# Patient Record
Sex: FEMALE | Race: WHITE | NOT HISPANIC OR LATINO | Employment: UNEMPLOYED | URBAN - METROPOLITAN AREA
[De-identification: names, ages, dates, MRNs, and addresses within clinical notes are randomized per-mention and may not be internally consistent; named-entity substitution may affect disease eponyms.]

---

## 2019-03-11 ENCOUNTER — APPOINTMENT (OUTPATIENT)
Dept: SLEEP MEDICINE | Facility: HOSPITAL | Age: 34
End: 2019-03-11

## 2021-01-05 ENCOUNTER — TELEPHONE (OUTPATIENT)
Dept: OBSTETRICS AND GYNECOLOGY | Age: 36
End: 2021-01-05

## 2021-01-05 NOTE — TELEPHONE ENCOUNTER
Tried calling pt and pt's spouse. Neither answered, VM was full or call could not be completed.     Tried calling pt to see if she plans on keeping appt tomorrow. If she no shows, she could be dismissed from the practice due to several no-shows as a new patient

## 2021-07-29 ENCOUNTER — TELEPHONE (OUTPATIENT)
Dept: URGENT CARE | Facility: CLINIC | Age: 36
End: 2021-07-29

## 2021-07-29 NOTE — TELEPHONE ENCOUNTER
Received a note on an [facility] that said to call the patient to let her know she was prescribed some pain medication. SHe also needed to take OTC Tylenol, 2 every 6hrs and OTC Ibuprofen 4, eery 8rs for pain- per Kirsten CORBETT.     Pt aware.   SH

## 2023-05-05 ENCOUNTER — OFFICE VISIT (OUTPATIENT)
Dept: OBSTETRICS AND GYNECOLOGY | Age: 38
End: 2023-05-05
Payer: COMMERCIAL

## 2023-05-05 VITALS
BODY MASS INDEX: 27.32 KG/M2 | WEIGHT: 164 LBS | SYSTOLIC BLOOD PRESSURE: 100 MMHG | DIASTOLIC BLOOD PRESSURE: 68 MMHG | HEIGHT: 65 IN

## 2023-05-05 DIAGNOSIS — E28.39 PREMATURE OVARIAN FAILURE: ICD-10-CM

## 2023-05-05 DIAGNOSIS — Z13.71 BRCA GENE MUTATION NEGATIVE: ICD-10-CM

## 2023-05-05 DIAGNOSIS — Z12.4 SCREENING FOR CERVICAL CANCER: ICD-10-CM

## 2023-05-05 DIAGNOSIS — Z01.419 WELL WOMAN EXAM WITH ROUTINE GYNECOLOGICAL EXAM: Primary | ICD-10-CM

## 2023-05-05 DIAGNOSIS — Z11.3 SCREEN FOR STD (SEXUALLY TRANSMITTED DISEASE): ICD-10-CM

## 2023-05-05 NOTE — PROGRESS NOTES
[facility]   Obstetrics and Gynecology   Routine Annual Visit    2023    Patient: Amber Albert          MR#:[id number]    History of Present Illness    Chief Complaint   Patient presents with   • Gynecologic Exam     Cc:  new pt today for annual visit , last seen and had pap  , no periods - premature ovarian failure , h/o infertility        37 y.o. female  who presents for annual exam.  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 Rodríguez around 27yo.  She was not menstruating and was unable to get pregnant which eventually led to the diagnosis.  She was placed on HRT at that time with p.o. Estrace and Provera 10 mg 10 days/month.  She was menstruating monthly on this for a very long time but then stopped menstruating so stopped the Provera about a year ago.  She has continued p.o. estrogen however.    She reports history of left ovarian cyst.  Reports intermittent left lower quadrant pain and thinks it may be related to the cyst.    US pelvis transvaginal (2023 14:47) 4cm simple cyst in left adnexa    Last pap in  normal, denies history of abnormal Pap smear, repeat today  Never had mammogram  Family history of ovarian cancer so had testing for hereditary cancer genes and it was negative per patient report    Obstetric History:  OB History        0    Para   0    Term   0       0    AB   0    Living   0       SAB   0    IAB   0    Ectopic   0    Molar        Multiple   0    Live Births                   Menstrual History:     No LMP recorded (lmp unknown). (Menstrual status: Other).       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, two boxers and one mixed rescue    ________________________________________  Patient Active Problem List   Diagnosis   • Premature ovarian failure   • BRCA gene mutation negative     Past Medical History:   Diagnosis  Date   • Anxiety    • Migraines    • Premature ovarian failure      Past Surgical History:   Procedure Laterality Date   • NO PAST SURGERIES       Social History     Tobacco Use   Smoking Status Never   Smokeless Tobacco Never     Family History   Problem Relation Age of Onset   • Hypertension Father    • Hypertension Mother    • Anxiety disorder Mother    • Breast cancer Maternal Grandmother    • Cancer Maternal Grandmother         Unsure of type of cancer.   • Ovarian cancer Maternal Aunt 31   • Anxiety disorder Maternal Aunt    • Cancer Maternal Aunt 30        Female cancer. Unsure if ovarian.   • Heart disease Other    • Uterine cancer Neg Hx    • Colon cancer Neg Hx      Prior to Admission medications    Medication Sig Start Date End Date Taking? Authorizing Provider   eletriptan (RELPAX) 40 MG tablet Take 40 mg by mouth 1 (One) Time As Needed for Migraine. may repeat in 2 hours if necessary    Emergency, Nurse Epic, RN   estradiol (ESTRACE) 2 MG tablet Take  by mouth Daily. 3/13/19   Emergency, Nurse Epic, RN   gabapentin (NEURONTIN) 300 MG capsule Take 1 capsule by mouth 3 (Three) Times a Day As Needed (pain). 7/29/21   Kisrten Chan APRN   levothyroxine (SYNTHROID, LEVOTHROID) 75 MCG tablet Take  by mouth Daily. 2/4/19   Emergency, Nurse Bianca RN   medroxyPROGESTERone (PROVERA) 10 MG tablet TK 1 T PO QD ON DAYS 1-10 OF CYCLE 2/25/19   Emergency, Nurse Bianca RN   pantoprazole (PROTONIX) 40 MG EC tablet  7/28/21   Emergency, Nurse Bianca RN   SUMAtriptan Succinate (IMITREX) 4 MG/0.5ML injection ADM 0.5 ML SC 1 TIME. MAY REPEAT IN 1 HOUR IF PAIN RETURNS / INCREASES IN SEVERITY. MAX 2 DOSES / 24 H 3/6/19   Emergency, Nurse Bianca RN   venlafaxine XR (EFFEXOR-XR) 150 MG 24 hr capsule TAKE ONE CAPSULE BY MOUTH DAILY 2/10/16   Provider, MD Elvira     ________________________________________    Current contraception: none  History of abnormal Pap smear: no  Family history of uterine or ovarian cancer: yes -  "see above  Family History of colon cancer/colon polyps: no  History of abnormal mammogram: no    The following portions of the patient's history were reviewed and updated as appropriate: allergies, current medications, past family history, past medical history, past social history, past surgical history and problem list.    Review of Systems   All other systems reviewed and are negative.           Objective     /68   Ht 165.1 cm (65\")   Wt 74.4 kg (164 lb)   LMP  (LMP Unknown)   BMI 27.29 kg/m²    BP Readings from Last 3 Encounters:   05/05/23 100/68   07/28/21 129/82   03/26/20 137/86      Wt Readings from Last 3 Encounters:   05/05/23 74.4 kg (164 lb)   07/28/21 83 kg (183 lb)   03/26/20 79.4 kg (175 lb)        BMI: Estimated body mass index is 27.29 kg/m² as calculated from the following:    Height as of this encounter: 165.1 cm (65\").    Weight as of this encounter: 74.4 kg (164 lb).    Physical Exam  Vitals and nursing note reviewed.   Constitutional:       General: She is not in acute distress.     Appearance: Normal appearance.   HENT:      Head: Normocephalic and atraumatic.   Eyes:      Extraocular Movements: Extraocular movements intact.   Cardiovascular:      Rate and Rhythm: Normal rate and regular rhythm.      Pulses: Normal pulses.      Heart sounds: No murmur heard.  Pulmonary:      Effort: Pulmonary effort is normal. No respiratory distress.      Breath sounds: Normal breath sounds.   Chest:   Breasts:     Right: Normal. No mass, nipple discharge, skin change or tenderness.      Left: Normal. No mass, nipple discharge, skin change or tenderness.   Abdominal:      General: There is no distension.      Palpations: Abdomen is soft. There is no mass.      Tenderness: There is no abdominal tenderness.   Genitourinary:     General: Normal vulva.      Labia:         Right: No rash or lesion.         Left: No rash or lesion.       Urethra: No prolapse, urethral swelling or urethral lesion.      " Vagina: Normal.      Cervix: Normal.      Uterus: Normal.       Adnexa: Right adnexa normal and left adnexa normal.      Comments: Bladder: no masses or tenderness  Perineum/Anus: no masses, lesions, or skin changes  Musculoskeletal:         General: No swelling. Normal range of motion.      Cervical back: Normal range of motion.   Lymphadenopathy:      Upper Body:      Right upper body: No axillary adenopathy.      Left upper body: No axillary adenopathy.   Skin:     General: Skin is warm and dry.   Neurological:      General: No focal deficit present.      Mental Status: She is alert and oriented to person, place, and time.   Psychiatric:         Mood and Affect: Mood normal.         Behavior: Behavior normal.         As part of wellness and prevention, the following topics were discussed with the patient:  Encouraged self breast exam  Physical activity and regular exercised encouraged.   Injury prevention discussed.  Healthy weight discussed.  Nutrition discussed.  Substance abuse/misuse discussed.  Sexual behavior/safe practices discussed.   Sexual transmitted disease prevention   Contraception discussed.   Mental health discussed.       Assessment:  Diagnoses and all orders for this visit:    1. Well woman exam with routine gynecological exam (Primary)  -     HIV-1 / O / 2 Ag / Antibody 4th Generation  -     RPR, Rfx Qn RPR / Confirm TP  -     IGP,CtNgTv,Apt HPV,rfx 16 / 18,45    2. Premature ovarian failure    3. BRCA gene mutation negative  Overview:  Reports hereditary cancer panel neg during ADE workup      4. Screen for STD (sexually transmitted disease)  -     HIV-1 / O / 2 Ag / Antibody 4th Generation  -     RPR, Rfx Qn RPR / Confirm TP  -     IGP,CtNgTv,Apt HPV,rfx 16 / 18,45    5. Screening for cervical cancer  -     IGP,CtNgTv,Apt HPV,rfx 16 / 18,45    - Breast and pelvic exam normal  - Pap today  - STD screen today  - Discussed premature ovarian failure.  Recommendation is to continue estrogen  therapy for bone and cardiovascular health benefit until at least 50 years old.  She has a uterus in situ so needs progesterone as well.  We discussed risk of endometrial cancer with unopposed estrogen.  She would like to not take Provera anymore so we agreed on progestin IUD instead.  - Plan for GYN ultrasound at next appointment to evaluate endometrium since she has had unopposed estrogen for about a year.  We will also evaluate left ovarian cyst at that time.  If all normal, will plan for Mirena IUD placement at that visit.    Plan:  Return for Check Mirena benefits, schedule insertion with me with gyn Us before insertion for amenorrhea.      Wilma Lombardi MD  5/5/2023 15:43 EDT

## 2023-05-06 LAB
HIV 1+2 AB+HIV1 P24 AG SERPL QL IA: NON REACTIVE
RPR SER QL: NON REACTIVE

## 2023-05-12 LAB
C TRACH RRNA CVX QL NAA+PROBE: NEGATIVE
CYTOLOGIST CVX/VAG CYTO: NORMAL
CYTOLOGY CVX/VAG DOC CYTO: NORMAL
CYTOLOGY CVX/VAG DOC THIN PREP: NORMAL
DX ICD CODE: NORMAL
HIV 1 & 2 AB SER-IMP: NORMAL
HPV GENOTYPE REFLEX: NORMAL
HPV I/H RISK 4 DNA CVX QL PROBE+SIG AMP: NEGATIVE
N GONORRHOEA RRNA CVX QL NAA+PROBE: NEGATIVE
OTHER STN SPEC: NORMAL
STAT OF ADQ CVX/VAG CYTO-IMP: NORMAL
T VAGINALIS RRNA SPEC QL NAA+PROBE: NEGATIVE

## 2023-05-15 ENCOUNTER — TELEPHONE (OUTPATIENT)
Dept: OBSTETRICS AND GYNECOLOGY | Age: 38
End: 2023-05-15
Payer: COMMERCIAL

## 2023-06-16 ENCOUNTER — OFFICE VISIT (OUTPATIENT)
Dept: OBSTETRICS AND GYNECOLOGY | Age: 38
End: 2023-06-16
Payer: COMMERCIAL

## 2023-06-16 VITALS
DIASTOLIC BLOOD PRESSURE: 68 MMHG | BODY MASS INDEX: 27.89 KG/M2 | HEIGHT: 65 IN | WEIGHT: 167.4 LBS | SYSTOLIC BLOOD PRESSURE: 120 MMHG

## 2023-06-16 DIAGNOSIS — E28.39 PREMATURE OVARIAN FAILURE: Primary | ICD-10-CM

## 2023-06-16 DIAGNOSIS — Z01.818 PREPROCEDURAL EXAMINATION: ICD-10-CM

## 2023-06-16 DIAGNOSIS — Z30.430 ENCOUNTER FOR IUD INSERTION: ICD-10-CM

## 2023-06-16 LAB
B-HCG UR QL: NEGATIVE
EXPIRATION DATE: NORMAL
INTERNAL NEGATIVE CONTROL: NORMAL
INTERNAL POSITIVE CONTROL: NORMAL
Lab: NORMAL

## 2023-06-16 RX ORDER — SUMATRIPTAN 100 MG/1
TABLET, FILM COATED ORAL
COMMUNITY
Start: 2023-05-22

## 2023-06-16 RX ORDER — ELETRIPTAN HYDROBROMIDE 40 MG/1
40 TABLET, FILM COATED ORAL
COMMUNITY
End: 2023-06-16 | Stop reason: ALTCHOICE

## 2023-06-16 RX ORDER — PROGESTERONE 100 MG/1
100 CAPSULE ORAL DAILY
Qty: 30 CAPSULE | Refills: 11 | Status: SHIPPED | OUTPATIENT
Start: 2023-06-16

## 2023-06-16 NOTE — PROGRESS NOTES
[facility]   Obstetrics and Gynecology     2023      Patient:  Amber Albert   MR#:[id number]    Office note    Chief Complaint   Patient presents with    Follow-up     Mirena insertion, U/S for amenorrhea today       Subjective     History of Present Illness  37 y.o. female  presents for follow up of POI.  She has been on HRT for many years but when she stopped menstruating about a year ago, she stopped her Provera because she was struggling with compliance while only taking it 10 days/month.  Continued estrogen though.  Wants to try Mirena IUD.    Reported history of simple ovarian cyst up to 4 cm.  Ultrasound today to reevaluate shows 3 cm simple cyst.      Relevant data reviewed:  US Non-ob Transvaginal (2023 15:27)      Patient Active Problem List   Diagnosis    Premature ovarian failure    BRCA gene mutation negative       Past Medical History:   Diagnosis Date    Anxiety     Migraines     Premature ovarian failure      Past Surgical History:   Procedure Laterality Date    WISDOM TOOTH EXTRACTION       Obstetric History:  OB History          0    Para   0    Term   0       0    AB   0    Living   0         SAB   0    IAB   0    Ectopic   0    Molar        Multiple   0    Live Births                   Menstrual History:     No LMP recorded. (Menstrual status: Other).       The patient has never been pregnant.  Family History   Problem Relation Age of Onset    Hypertension Mother     Anxiety disorder Mother     Osteoporosis Mother     Breast cancer Maternal Grandmother     Dementia Maternal Grandmother     Ovarian cancer Maternal Aunt 31    Anxiety disorder Maternal Aunt     Cancer Maternal Aunt 30        Female cancer. Unsure if ovarian.    Heart disease Other     Uterine cancer Neg Hx     Colon cancer Neg Hx      Social History     Tobacco Use    Smoking status: Never     Passive exposure: Never    Smokeless tobacco: Never   Vaping Use    Vaping Use: Never used  "  Substance Use Topics    Alcohol use: No    Drug use: No     Penicillins    Current Outpatient Medications:     estradiol (ESTRACE) 2 MG tablet, Take  by mouth Daily., Disp: , Rfl: 2    levothyroxine (SYNTHROID, LEVOTHROID) 75 MCG tablet, Take  by mouth Daily., Disp: , Rfl: 7    pantoprazole (PROTONIX) 40 MG EC tablet, , Disp: , Rfl:     SUMAtriptan (IMITREX) 100 MG tablet, TAKE 1 TABLET BY MOUTH AT ONSET OF HEADACHE. MAY REPEAT IN 2 HOURS IF NEEDED. MAX OF 2 TABLETS IN 24 HOURS, Disp: , Rfl:     SUMAtriptan Succinate (IMITREX) 4 MG/0.5ML injection, ADM 0.5 ML SC 1 TIME. MAY REPEAT IN 1 HOUR IF PAIN RETURNS / INCREASES IN SEVERITY. MAX 2 DOSES / 24 H, Disp: , Rfl: 0    venlafaxine XR (EFFEXOR-XR) 150 MG 24 hr capsule, TAKE ONE CAPSULE BY MOUTH DAILY, Disp: , Rfl: 2    Progesterone (Prometrium) 100 MG capsule, Take 1 capsule by mouth Daily., Disp: 30 capsule, Rfl: 11  No current facility-administered medications for this visit.    The following portions of the patient's history were reviewed and updated as appropriate: allergies, current medications, past family history, past medical history, past social history, past surgical history, and problem list.    Review of Systems   All other systems reviewed and are negative.    BP Readings from Last 3 Encounters:   06/16/23 120/68   05/05/23 100/68   07/28/21 129/82      Wt Readings from Last 3 Encounters:   06/16/23 75.9 kg (167 lb 6.4 oz)   05/05/23 74.4 kg (164 lb)   07/28/21 83 kg (183 lb)      BMI: Estimated body mass index is 27.86 kg/m² as calculated from the following:    Height as of this encounter: 165.1 cm (65\").    Weight as of this encounter: 75.9 kg (167 lb 6.4 oz). BSA: Estimated body surface area is 1.83 meters squared as calculated from the following:    Height as of this encounter: 165.1 cm (65\").    Weight as of this encounter: 75.9 kg (167 lb 6.4 oz).    Objective   Physical Exam  Vitals and nursing note reviewed.   Constitutional:       General: She " is not in acute distress.     Appearance: Normal appearance.   Pulmonary:      Effort: Pulmonary effort is normal. No respiratory distress.   Abdominal:      General: There is no distension.      Palpations: Abdomen is soft.      Tenderness: There is no abdominal tenderness.   Genitourinary:     General: Normal vulva.      Vagina: Normal.      Cervix: Normal.   Neurological:      General: No focal deficit present.      Mental Status: She is alert and oriented to person, place, and time.   Psychiatric:         Mood and Affect: Mood normal.         Behavior: Behavior normal.         Thought Content: Thought content normal.         Judgment: Judgment normal.     IUD Insertion Procedure Note    Indication:  Endometrial protection    Procedure Details   Urine pregnancy test confirmed negative.  The risks (including infection, bleeding, pain, and uterine perforation) and benefits of the procedure were explained to the patient and verbal informed consent was obtained.      Cervix was cleansed with Betadine. Allis clamp applied to anterior cervix.  Attempted to sound uterus but was unable to pass sound farther than 2 cm.  Attempted dilation with Harvey dilators but also was unable to pass farther than 2 cm.  Ultimately procedure was halted.  Hemostasis was confirmed.  Patient tolerated well.      Condition:  Stable    Complications:  None, patient tolerated the procedure well    Plan:  The patient was advised to call for fever, prolonged or severe pain, or persistent bleeding. She was advised to use NSAIDs as needed for mild to moderate pain.       Procedure time: 7 minutes      Assessment & Plan     Diagnoses and all orders for this visit:    1. Premature ovarian failure (Primary)    2. Encounter for IUD insertion  -     POC Pregnancy, Urine  -     Discontinue: Levonorgestrel (MIRENA) 20 MCG/DAY IUD intrauterine device 1 each    3. Preprocedural examination  -     POC Pregnancy, Urine    Other orders  -     Progesterone  (Prometrium) 100 MG capsule; Take 1 capsule by mouth Daily.  Dispense: 30 capsule; Refill: 11    -Reviewed today's ultrasound with thin endometrial stripe and 3 cm simple cyst on left ovary.  No need for further imaging.  - Discussed continuing estrogen therapy until at least 50 years old.  Discussed need for progesterone to protect endometrium from hyperstimulation.  Attempted IUD insertion today but was unsuccessful due to stenotic cervix.  Discussed possibility of trying Cytotec and attempting again but patient declined.  She would like to try p.o. progesterone daily instead.  Her compliance issue was with the cyclic progesterone so I am optimistic this will work for her.  - Discussed need for mammograms when she turns 38 years old.  Discussed risk of breast cancer with prolonged progesterone use.    Return in about 1 year (around 6/16/2024) for Annual.    I spent 20 minutes caring for Amber Albert on this date of service. This time includes time spent by me in the following activities: preparing for the visit, reviewing tests, obtaining and/or reviewing a separately obtained history, performing a medically appropriate examination and/or evaluation, counseling and educating the patient/family/caregiver, ordering medications, tests, or procedures and documenting information in the medical record.  This time does NOT include time spent on separately reported services.    Wilma Lombardi MD   6/16/2023 16:17 EDT

## 2024-06-19 RX ORDER — PROGESTERONE 100 MG/1
100 CAPSULE ORAL DAILY
Qty: 30 CAPSULE | Refills: 11 | Status: SHIPPED | OUTPATIENT
Start: 2024-06-19

## 2025-03-07 ENCOUNTER — TELEPHONE (OUTPATIENT)
Dept: OBSTETRICS AND GYNECOLOGY | Age: 40
End: 2025-03-07

## 2025-03-07 NOTE — TELEPHONE ENCOUNTER
Caller: Amber Albert    Relationship:  Self    Best call back number: [phone]    PATIENT CALLED REQUESTING TO CANCEL SAME DAY APPT.    Did the patient call AFTER the start time of their scheduled appointment?  []YES  [x]NO    Was the patient's appointment rescheduled? [x]YES  []NO    Any additional information: CONFLICT - R/S'd TO 04/18/25

## 2025-04-24 RX ORDER — ESTRADIOL 2 MG/1
2 TABLET ORAL DAILY
Qty: 30 TABLET | Refills: 0 | Status: SHIPPED | OUTPATIENT
Start: 2025-04-24

## 2025-04-24 NOTE — TELEPHONE ENCOUNTER
Caller: Amber Albert    Relationship: Self    Best call back number: [phone]      Requested Prescriptions:   Requested Prescriptions     Pending Prescriptions Disp Refills    estradiol (ESTRACE) 2 MG tablet  2     Sig: Take  by mouth Daily.      Medroxyprogesterone      Pharmacy where request should be sent:    PLEASE UPDATE:  [facility]  Address: [address] [place] [address]  Phone: [phone]    Last office visit with prescribing clinician: 6/16/2023   Last telemedicine visit with prescribing clinician: Visit date not found   Next office visit with prescribing clinician: 8/15/2025     Does the patient have less than a 3 day supply:  [x] Yes  [] No    Would you like a call back once the refill request has been completed: [x] Yes [] No    If the office needs to give you a call back, can they leave a voicemail: [x] Yes [] No

## 2025-05-16 ENCOUNTER — TELEPHONE (OUTPATIENT)
Dept: OBSTETRICS AND GYNECOLOGY | Age: 40
End: 2025-05-16

## 2025-05-16 ENCOUNTER — DOCUMENTATION (OUTPATIENT)
Dept: OBSTETRICS AND GYNECOLOGY | Age: 40
End: 2025-05-16

## 2025-05-16 RX ORDER — ESTRADIOL 2 MG/1
2 TABLET ORAL DAILY
Qty: 30 TABLET | Refills: 1 | Status: SHIPPED | OUTPATIENT
Start: 2025-05-16

## 2025-05-16 RX ORDER — ESTRADIOL 2 MG/1
2 TABLET ORAL DAILY
Qty: 30 TABLET | Refills: 1 | Status: SHIPPED | OUTPATIENT
Start: 2025-05-16 | End: 2025-05-16 | Stop reason: SDUPTHER

## 2025-05-16 RX ORDER — PROGESTERONE 100 MG/1
100 CAPSULE ORAL DAILY
Qty: 30 CAPSULE | Refills: 1 | Status: SHIPPED | OUTPATIENT
Start: 2025-05-16

## 2025-05-16 RX ORDER — PROGESTERONE 100 MG/1
100 CAPSULE ORAL DAILY
Qty: 30 CAPSULE | Refills: 1 | Status: SHIPPED | OUTPATIENT
Start: 2025-05-16 | End: 2025-05-16 | Stop reason: SDUPTHER

## 2025-05-16 NOTE — TELEPHONE ENCOUNTER
We have called pt multiple times and left VM to come in earlier for her appt today, since Dr Lombardi cannot stay that late. If pt calls please have her move her appt up to an earlier time or she will need to reschedule.

## 2025-05-16 NOTE — PROGRESS NOTES
Patient arrived to appointment today but reported that insurance will not be active til 6/1/25.  She requested annual reschedule which was changed to 6/20/25.  She also requested refills of estrogen and progesterone to cover her until appointment which I provided.    Dr. Christopher Lombardi MD, FACOG  05/16/25  15:04 EDT

## 2025-05-16 NOTE — TELEPHONE ENCOUNTER
I called pt once more and left another VM stating Dr Lombardi could not stay and we had to cancel her appt, that if she calls back and can come in before 3 we can put her back on the schedule.

## 2025-07-28 RX ORDER — ESTRADIOL 2 MG/1
2 TABLET ORAL DAILY
Qty: 30 TABLET | Refills: 1 | OUTPATIENT
Start: 2025-07-28